# Patient Record
Sex: FEMALE | Race: WHITE | NOT HISPANIC OR LATINO | Employment: FULL TIME | URBAN - METROPOLITAN AREA
[De-identification: names, ages, dates, MRNs, and addresses within clinical notes are randomized per-mention and may not be internally consistent; named-entity substitution may affect disease eponyms.]

---

## 2024-07-30 ENCOUNTER — TELEPHONE (OUTPATIENT)
Dept: TRANSPLANT | Facility: HOSPITAL | Age: 34
End: 2024-07-30

## 2024-07-30 NOTE — TELEPHONE ENCOUNTER
Referral and labs reviewed that were completed through [facility].  Pt from [place]. Lives closer to [facility] in [place]. Will discuss remote donation. Need to clarify where recipient is located in [place].  Call placed to [facility] regarding referral. No Answer. Voicemail left requesting call back.